# Patient Record
Sex: FEMALE | Race: ASIAN | URBAN - METROPOLITAN AREA
[De-identification: names, ages, dates, MRNs, and addresses within clinical notes are randomized per-mention and may not be internally consistent; named-entity substitution may affect disease eponyms.]

---

## 2022-06-07 ENCOUNTER — OFFICE VISIT (OUTPATIENT)
Dept: URBAN - METROPOLITAN AREA CLINIC 23 | Facility: CLINIC | Age: 27
End: 2022-06-07
Payer: COMMERCIAL

## 2022-06-07 ENCOUNTER — WEB ENCOUNTER (OUTPATIENT)
Dept: URBAN - METROPOLITAN AREA CLINIC 23 | Facility: CLINIC | Age: 27
End: 2022-06-07

## 2022-06-07 DIAGNOSIS — K92.1 HEMATOCHEZIA: ICD-10-CM

## 2022-06-07 DIAGNOSIS — R19.7 DIARRHEA: ICD-10-CM

## 2022-06-07 DIAGNOSIS — R10.13 EPIGASTRIC ABDOMINAL PAIN: ICD-10-CM

## 2022-06-07 PROCEDURE — 99204 OFFICE O/P NEW MOD 45 MIN: CPT | Performed by: INTERNAL MEDICINE

## 2022-06-07 RX ORDER — HYOSCYAMINE SULFATE 0.12 MG/1
1 TABLET AS NEEDED TABLET ORAL
Qty: 60 | Refills: 3 | OUTPATIENT
Start: 2022-06-07 | End: 2022-10-05

## 2022-06-07 NOTE — HPI-TODAY'S VISIT:
26-year-old female presented today for evaluation of 1 episode of epigastric abdominal pain nausea vomiting diarrhea.  Symptoms started about 2 weeks ago she reported after she ate at night she feels sick in the stomach has nausea and then she woke up in the middle of the night with severe pain cramping diarrhea she vomited multiple times she has low-grade fever associated with the symptoms.  Symptoms lasted for 2 days of abdominal pain and diarrhea.  The abdominal pain has improved still has loose stool no fever she feels nauseated.  She report she had history of ulcer when she was in [place] but her pain mostly in the periumbilical area.  She has 2-3 bowel movement a day currently the loose stool.  Since the symptoms started she feels better but still has this discomfort.  No family history of stomach cancer or colon cancer no recent antibiotic use.

## 2022-06-10 ENCOUNTER — LAB OUTSIDE AN ENCOUNTER (OUTPATIENT)
Dept: URBAN - METROPOLITAN AREA CLINIC 23 | Facility: CLINIC | Age: 27
End: 2022-06-10

## 2022-06-12 ENCOUNTER — TELEPHONE ENCOUNTER (OUTPATIENT)
Dept: URBAN - METROPOLITAN AREA CLINIC 23 | Facility: CLINIC | Age: 27
End: 2022-06-12

## 2022-06-14 ENCOUNTER — TELEPHONE ENCOUNTER (OUTPATIENT)
Dept: URBAN - METROPOLITAN AREA CLINIC 23 | Facility: CLINIC | Age: 27
End: 2022-06-14

## 2022-06-14 LAB
A/G RATIO: 1.6
ALBUMIN: 4.7
ALKALINE PHOSPHATASE: 84
AST (SGOT): 17
BASO (ABSOLUTE): 0.1
BASOS: 1
BILIRUBIN, TOTAL: <0.2
BUN/CREATININE RATIO: 19
BUN: 11
CALCIUM: 9.1
CARBON DIOXIDE, TOTAL: 20
CHLORIDE: 102
CREATININE: 0.57
DEAMIDATED GLIADIN ABS, IGA: 3
DEAMIDATED GLIADIN ABS, IGG: 1
EGFR: 128
ENDOMYSIAL ANTIBODY IGA: NEGATIVE
EOS (ABSOLUTE): 0.1
EOS: 1
F002-IGE MILK: <0.1
F004-IGE WHEAT: <0.1
F008-IGE CORN: <0.1
F013-IGE PEANUT: <0.1
F014-IGE SOYBEAN: <0.1
F026-IGE PORK: <0.1
F027-IGE BEEF: <0.1
F093-IGE CHOCOLATE/CACAO: <0.1
F245-IGE EGG, WHOLE: <0.1
FX02-IGE FOOD MIX (SEAFOODS): NEGATIVE
GLOBULIN, TOTAL: 3
GLUCOSE: 96
H PYLORI BREATH TEST: POSITIVE
HEMATOCRIT: 41.7
HEMATOLOGY COMMENTS:: (no result)
HEMOGLOBIN: 12.5
IMMATURE CELLS: (no result)
IMMATURE GRANS (ABS): 0
IMMATURE GRANULOCYTES: 0
IMMUNOGLOBULIN A, QN, SERUM: 292
LYMPHS (ABSOLUTE): 2.5
LYMPHS: 38
Lab: (no result)
MCH: 25.4
MCHC: 30
MCV: 85
MONOCYTES(ABSOLUTE): 0.5
MONOCYTES: 7
NEUTROPHILS (ABSOLUTE): 3.4
NEUTROPHILS: 53
NRBC: (no result)
PLATELETS: (no result)
POTASSIUM: 4.6
PROTEIN, TOTAL: 7.7
RBC: 4.93
RDW: 15.7
SODIUM: 138
T-TRANSGLUTAMINASE (TTG) IGA: <2
T-TRANSGLUTAMINASE (TTG) IGG: <2
WBC: 6.5

## 2022-06-14 RX ORDER — CLARITHROMYCIN 500 MG/1
1 TABLET TABLET, FILM COATED ORAL
Qty: 28 TABLET | OUTPATIENT
Start: 2022-06-14 | End: 2022-06-28

## 2022-06-14 RX ORDER — AMOXICILLIN 500 MG/1
2 CAPSULES CAPSULE ORAL
Qty: 56 CAPSULE | OUTPATIENT
Start: 2022-06-14 | End: 2022-06-28

## 2022-06-14 RX ORDER — OMEPRAZOLE 20 MG
1 CAPSULE BEFORE MEAL TABLET, DELAYED RELEASE (ENTERIC COATED) ORAL TWICE A DAY
Qty: 28 CAPSULE | Refills: 0 | OUTPATIENT
Start: 2022-06-14

## 2022-06-15 LAB
CALPROTECTIN, STOOL - QDX: (no result)
GASTROINTESTINAL PATHOGEN: (no result)

## 2022-06-17 ENCOUNTER — TELEPHONE ENCOUNTER (OUTPATIENT)
Dept: URBAN - METROPOLITAN AREA CLINIC 23 | Facility: CLINIC | Age: 27
End: 2022-06-17

## 2022-08-16 ENCOUNTER — DASHBOARD ENCOUNTERS (OUTPATIENT)
Age: 27
End: 2022-08-16

## 2022-08-16 ENCOUNTER — OFFICE VISIT (OUTPATIENT)
Dept: URBAN - METROPOLITAN AREA CLINIC 23 | Facility: CLINIC | Age: 27
End: 2022-08-16
Payer: COMMERCIAL

## 2022-08-16 VITALS
TEMPERATURE: 97.3 F | DIASTOLIC BLOOD PRESSURE: 87 MMHG | SYSTOLIC BLOOD PRESSURE: 127 MMHG | WEIGHT: 101 LBS | HEIGHT: 65 IN | BODY MASS INDEX: 16.83 KG/M2 | HEART RATE: 82 BPM

## 2022-08-16 DIAGNOSIS — B96.81 HELICOBACTER PYLORI [H. PYLORI] AS THE CAUSE OF DISEASES CLASSIFIED ELSEWHERE: ICD-10-CM

## 2022-08-16 DIAGNOSIS — R10.13 EPIGASTRIC ABDOMINAL PAIN: ICD-10-CM

## 2022-08-16 DIAGNOSIS — A04.5 CAMPYLOBACTER DIARRHEA: ICD-10-CM

## 2022-08-16 DIAGNOSIS — K29.70 GASTRITIS, UNSPECIFIED, WITHOUT BLEEDING: ICD-10-CM

## 2022-08-16 PROCEDURE — 99213 OFFICE O/P EST LOW 20 MIN: CPT | Performed by: INTERNAL MEDICINE

## 2022-08-16 RX ORDER — HYOSCYAMINE SULFATE 0.12 MG/1
1 TABLET AS NEEDED TABLET ORAL
Qty: 60 | Refills: 3 | Status: ACTIVE | COMMUNITY
Start: 2022-06-07 | End: 2022-10-05

## 2022-08-16 NOTE — HPI-TODAY'S VISIT:
27-year-old female presented today for follow-up after she finished treatment for H. pylori.  Patient was seen in June 2022 for abdominal pain nausea vomiting diarrhea.  She had H. pylori breath test which was positive for H. pylori she had stool study which was positive for Campylobacter and enteropathogenic E. coli.  She finished 2 weeks of treatment with triple therapy omeprazole amoxicillin and Biaxin 4 weeks ago.  She is here for follow-up she reports she feels better still has occasional bloating no nausea no vomiting  She tolerated H. pylori treatment well she still has occasional bloating and pain

## 2022-08-18 ENCOUNTER — TELEPHONE ENCOUNTER (OUTPATIENT)
Dept: URBAN - METROPOLITAN AREA CLINIC 92 | Facility: CLINIC | Age: 27
End: 2022-08-18

## 2022-08-18 PROBLEM — 89538001: Status: ACTIVE | Noted: 2022-08-16

## 2022-08-18 LAB
H PYLORI BREATH TEST: NOT DETECTED
H. PYLORI BREATH TEST: NOT DETECTED
INTERPRETATION: NOT DETECTED